# Patient Record
Sex: MALE | Race: WHITE | NOT HISPANIC OR LATINO | URBAN - NONMETROPOLITAN AREA
[De-identification: names, ages, dates, MRNs, and addresses within clinical notes are randomized per-mention and may not be internally consistent; named-entity substitution may affect disease eponyms.]

---

## 2024-05-02 ENCOUNTER — OFFICE (OUTPATIENT)
Dept: URBAN - NONMETROPOLITAN AREA CLINIC 5 | Facility: CLINIC | Age: 39
End: 2024-05-02

## 2024-05-02 VITALS
WEIGHT: 199 LBS | SYSTOLIC BLOOD PRESSURE: 150 MMHG | HEART RATE: 74 BPM | DIASTOLIC BLOOD PRESSURE: 89 MMHG | HEIGHT: 71 IN | RESPIRATION RATE: 18 BRPM

## 2024-05-02 DIAGNOSIS — R10.84 GENERALIZED ABDOMINAL PAIN: ICD-10-CM

## 2024-05-02 DIAGNOSIS — K12.0 RECURRENT ORAL APHTHAE: ICD-10-CM

## 2024-05-02 DIAGNOSIS — K92.1 MELENA: ICD-10-CM

## 2024-05-02 DIAGNOSIS — R19.8 OTHER SPECIFIED SYMPTOMS AND SIGNS INVOLVING THE DIGESTIVE S: ICD-10-CM

## 2024-05-02 DIAGNOSIS — K21.9 GASTRO-ESOPHAGEAL REFLUX DISEASE WITHOUT ESOPHAGITIS: ICD-10-CM

## 2024-05-02 PROCEDURE — 99204 OFFICE O/P NEW MOD 45 MIN: CPT | Performed by: INTERNAL MEDICINE

## 2024-05-02 NOTE — SERVICEHPINOTES
Rick Alamo   is a   38 yo  male  with a past medical history of hypoglycemia and hypogonadism who presents to establish care for chronic abdominal pain, diarrhea, constipation, and family history of Crohn disease.  Patient reports he has been battling GI issues for 12-13 years.  He previously followed with Dr. Pool in [place].  He then started following with Dr. Clark in [place].  He reported abdominal cramps, irregular bowel habits, hematochezia, and reflux at that time.  He reports undergoing an EGD and colonoscopy in 2015 at which time LA class C esophagitis seen and was also told he had ulcerative colitis.  He notes the clinic then lost his records and when he followed up they repeated his scopes but they did not appreciate these findings.  He reports he currently has heartburn, diarrhea, constipation, acid reflux, bright red blood per rectum, mucus in the stools, and occasional urgency with bowel movements.  He denies any dysphagia or nocturnal bowel movements.  he does report a family history of colon cancer in his grandfather.  He has not been on a PPI.  He has not had stool studies to rule out infectious etiology.  He was accompanied by his wife and son today.

## 2024-05-02 NOTE — SERVICENOTES
Given patient's questionable history of inflammatory bowel disease as well as esophagitis, hematochezia, abdominal pain, GERD will plan for EGD and colonoscopy.  Will plan for small bowel biopsies rule out celiac disease gastric biopsies rule out H.pylori.  Given patient's hematochezia and diarrhea will plan to rule out infectious etiology.  Also obtain a fecal calprotectin.  Time of colonoscopy will obtain random colon biopsies.  Plan to see back in clinic in 6 weeks to discuss next steps in management.